# Patient Record
Sex: MALE | Race: OTHER | HISPANIC OR LATINO | Employment: FULL TIME | URBAN - METROPOLITAN AREA
[De-identification: names, ages, dates, MRNs, and addresses within clinical notes are randomized per-mention and may not be internally consistent; named-entity substitution may affect disease eponyms.]

---

## 2023-03-07 ENCOUNTER — HOSPITAL ENCOUNTER (EMERGENCY)
Facility: MEDICAL CENTER | Age: 23
End: 2023-03-07
Attending: EMERGENCY MEDICINE
Payer: COMMERCIAL

## 2023-03-07 VITALS
BODY MASS INDEX: 26.57 KG/M2 | RESPIRATION RATE: 18 BRPM | SYSTOLIC BLOOD PRESSURE: 134 MMHG | WEIGHT: 155.65 LBS | HEART RATE: 65 BPM | TEMPERATURE: 97.4 F | DIASTOLIC BLOOD PRESSURE: 90 MMHG | HEIGHT: 64 IN | OXYGEN SATURATION: 98 %

## 2023-03-07 DIAGNOSIS — S51.811A LACERATION OF RIGHT FOREARM, INITIAL ENCOUNTER: ICD-10-CM

## 2023-03-07 PROCEDURE — 99282 EMERGENCY DEPT VISIT SF MDM: CPT

## 2023-03-07 PROCEDURE — 304999 HCHG REPAIR-SIMPLE/INTERMED LEVEL 1

## 2023-03-07 PROCEDURE — 303353 HCHG DERMABOND SKIN ADHESIVE

## 2023-03-07 NOTE — LETTER
"  FORM C-4:  EMPLOYEE’S CLAIM FOR COMPENSATION/ REPORT OF INITIAL TREATMENT  EMPLOYEE’S CLAIM - PROVIDE ALL INFORMATION REQUESTED   First Name Aleksander Last Name Bri Acevedo Birthdate 2000  Sex male Claim Number   Home Address [address]             [place] [address]                                   Age  22 y.o. Height  1.626 m (5' 4\") Weight  70.6 kg (155 lb 10.3 oz) [facility]     Mailing Address [address]              Zip [address] Telephone  There are no phone numbers on file. 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 Be specific and answer in detail. Use additional sheet if necessary)  cleaning machine & the  slipped & blade cut R forearm   If you believe that you have an occupational disease, when did you first have knowledge of the disability and it relationship to your employment? NA Witnesses to the Accident  NA   Nature of Injury or Occupational Disease  Workers' Compensation Part(s) of Body Injured or Affected  Lower Arm (R), N/A, N/A    I CERTIFY THAT THE ABOVE IS TRUE AND CORRECT TO THE BEST OF MY KNOWLEDGE AND THAT I HAVE PROVIDED THIS INFORMATION IN ORDER TO OBTAIN THE BENEFITS OF [place] " OR [place]).  I HEREBY AUTHORIZE ANY PHYSICIAN, CHIROPRACTOR, SURGEON, PRACTITIONER, OR OTHER PERSON, ANY HOSPITAL, INCLUDING [facility], ANY MEDICAL SERVICE ORGANIZATION, ANY INSURANCE COMPANY, OR OTHER INSTITUTION OR ORGANIZATION TO RELEASE TO EACH OTHER, ANY MEDICAL OR OTHER INFORMATION, INCLUDING BENEFITS PAID OR PAYABLE, PERTINENT TO THIS INJURY OR DISEASE, EXCEPT INFORMATION RELATIVE TO DIAGNOSIS, TREATMENT AND/OR COUNSELING FOR AIDS, PSYCHOLOGICAL CONDITIONS, ALCOHOL OR CONTROLLED SUBSTANCES, FOR WHICH I MUST GIVE SPECIFIC AUTHORIZATION.  A PHOTOSTAT OF THIS AUTHORIZATION SHALL BE AS VALID AS THE ORIGINAL.  Date      03/07/2023                                Place    [facility]                 Employee’s Signature   THIS REPORT MUST BE COMPLETED AND MAILED WITHIN 3 WORKING DAYS OF TREATMENT   Place [facility], EMERGENCY DEPT                       Name of Facility [facility]   Date  3/7/2023 Diagnosis  (S51.811A) Laceration of right forearm, initial encounter Is there evidence the injured employee was under the influence of alcohol and/or another controlled substance at the time of accident?   Hour  5:43 PM Description of Injury or Disease  Laceration of right forearm, initial encounter No   Treatment  Wound irrigation, closure  Have you advised the patient to remain off work five days or more?         No   X-Ray Findings    If Yes   From Date    To Date      From information given by the employee, together with medical evidence, can you directly connect this injury or occupational disease as job incurred? Yes If No, is employee capable of: Full Duty  Yes Modified Duty      Is additional medical care by a physician indicated? No If Modified Duty, Specify any Limitations / Restrictions       Do you know of any previous injury or disease contributing to this condition or occupational disease? No    Date 3/7/2023 Print Doctor’s  "Name Trish Carrasco I certify the employer’s copy of this form was mailed on:   Address [address] [address] [phone] INSURER’S USE ONLY   Provider’s Tax ID Number [id number] Telephone Dept: [phone]    Doctor’s Signature chilo-TRISH Jara M.D. Degree  MD      Form C-4 (rev.10/07)                                                                         BRIEF DESCRIPTION OF RIGHTS AND BENEFITS  (Pursuant to [place] 616[place].050)    Notice of Injury or Occupational Disease (Incident Report Form C-1): If an injury or occupational disease (OD) arises out of and in the course of employment, you must provide written notice to your employer as soon as practicable, but no later than 7 days after the accident or OD. Your employer shall maintain a sufficient supply of the required forms.    Claim for Compensation (Form C-4): If medical treatment is sought, the form C-4 is available at the place of initial treatment. A completed \"Claim for Compensation\" (Form C-4) must be filed within 90 days after an accident or OD. The treating physician or chiropractor must, within 3 working days after treatment, complete and mail to the employer, the employer's insurer and third-party , the Claim for Compensation.    Medical Treatment: If you require medical treatment for your on-the-job injury or OD, you may be required to select a physician or chiropractor from a list provided by your workers’ compensation insurer, if it has contracted with an Organization for Managed Care (MCO) or Preferred Provider Organization (PPO) or providers of health care. If your employer has not entered into a contract with an MCO or PPO, you may select a physician or chiropractor from the Panel of Physicians and Chiropractors. Any medical costs related to your industrial injury or OD will be paid by your insurer.    Temporary Total Disability (TTD): If your doctor has certified that you are unable to work for a period of at least 5 " consecutive days, or 5 cumulative days in a 20-day period, or places restrictions on you that your employer does not accommodate, you may be entitled to TTD compensation.    Temporary Partial Disability (TPD): If the wage you receive upon reemployment is less than the compensation for TTD to which you are entitled, the insurer may be required to pay you TPD compensation to make up the difference. TPD can only be paid for a maximum of 24 months.    Permanent Partial Disability (PPD): When your medical condition is stable and there is an indication of a PPD as a result of your injury or OD, within 30 days, your insurer must arrange for an evaluation by a rating physician or chiropractor to determine the degree of your PPD. The amount of your PPD award depends on the date of injury, the results of the PPD evaluation, your age and wage.    Permanent Total Disability (PTD): If you are medically certified by a treating physician or chiropractor as permanently and totally disabled and have been granted a PTD status by your insurer, you are entitled to receive monthly benefits not to exceed 66 2/3% of your average monthly wage. The amount of your PTD payments is subject to reduction if you previously received a lump-sum PPD award.    Vocational Rehabilitation Services: You may be eligible for vocational rehabilitation services if you are unable to return to the job due to a permanent physical impairment or permanent restrictions as a result of your injury or occupational disease.    Transportation and Per Emilee Reimbursement: You may be eligible for travel expenses and per emilee associated with medical treatment.    Reopening: You may be able to reopen your claim if your condition worsens after claim closure.     Appeal Process: If you disagree with a written determination issued by the insurer or the insurer does not respond to your request, you may appeal to the Department of Administration, , by following  the instructions contained in your determination letter. You must appeal the determination within 70 days from the date of the determination letter at [address], [address], [place], [place] [address], or [address], [address], [place], [place] [address]. If you disagree with the  decision, you may appeal to the Department of Administration, . You must file your appeal within 30 days from the date of the  decision letter at [address], [address], [place], [place] [address], or [address], [address], [place], [place] [address]. If you disagree with a decision of an , you may file a petition for judicial review with the District Court. You must do so within 30 days of the Appeal Officer’s decision. You may be represented by an  at your own expense or you may contact the [facility] for possible representation.    [place]  for Injured Workers (NAIW): If you disagree with a  decision, you may request that NAIW represent you without charge at an  Hearing. For information regarding denial of benefits, you may contact the [facility] at: [address], [address], [place] [address], [phone], or [address], [address], [place] [address], [phone]    To File a Complaint with the Division: If you wish to file a complaint with the  of the Division of Industrial Relations (DIR),  please contact the Workers’ Compensation Section, [address], [address], [place], [place] [address], telephone [phone]) [phone], or [address], [address], [place], [place] [address], telephone [phone].    For assistance with Workers’ Compensation Issues: You may contact the [facility] Office for Consumer Health Assistance, [address], [address], [place], [place] [address], Toll Free 1-855.781.1751, Web site: http://[facility].[place].gov/Programs/ALEJANDRA  E-mail: [email or web address].[place].gov  D-2 (rev. 10/20)              __________________________________________________________________                                    03/07/2023            Employee Name / Signature                                                                                                                            Date

## 2023-03-07 NOTE — ED TRIAGE NOTES
Chief Complaint   Patient presents with    Arm Injury     PT received small laceration on right forearm after  slipped at work. Laceration, 1.5 cm in length, adipose tissue visible. Bleeding controlled. Unsure of tetanus vaccination status. CMSx3.      PT ambulatory to triage for above complaint. GCS 15.    Small bandage placed, bleeding well controlled.      Pt is alert and oriented, speaking in full sentences, follows commands and responds appropriately to questions. NAD. Resp are even and unlabored.      Pt placed in lobby. Pt educated on triage process. Pt encouraged to alert staff for any changes.     Patient and staff wearing appropriate PPE

## 2023-03-08 NOTE — DISCHARGE INSTRUCTIONS
You were treated today for a laceration. Your physical exam is reassuring. Your laceration was closed with a skin glue. Please do not apply any oil-based ointment, as this will make the glue breakdown early. Please do not soak your hand in water, as this will also cause the glue to breakdown and early. Over time, the glue will begin to break down and will come off on its own. The glue is water resistant, so you can wash it with soap and water. Showering is OK.     Please come back to the ED if you develop fever, chills, increased redness around the wound, drainage from wound or for any other concerns.

## 2023-03-08 NOTE — ED PROVIDER NOTES
ER Provider Note    Scribed for William Carrasco M.d. by Jeremias Allen. 3/7/2023  4:40 PM    Primary Care Provider: No primary care provider noted.    CHIEF COMPLAINT  Chief Complaint   Patient presents with    Arm Injury     PT received small laceration on right forearm after  slipped at work. Laceration, 1.5 cm in length, adipose tissue visible. Bleeding controlled. Unsure of tetanus vaccination status. CMSx3.      LIMITATION TO HISTORY   Select: Language Monegasque,  Used  A coworker interpreted for the patient as per his preference    HPI/ROS  OUTSIDE HISTORIAN(S):  Coworker translated    EXTERNAL RECORDS REVIEWED  No prior records available for review    Aleksander Acevedo is a 22 y.o. male who presents to the ED for evaluation of a right arm laceration onset prior to arrival. The patient states that while using a  to clean a machine at work it accidentally slipped, causing his to cut his right forearm. He was ultimately transported to the ED for further evaluation of this laceration. Bleeding is controlled at this time. Associated symptoms include right forearm pain. The patient denies any right forearm swelling or loss of sensation to the right upper extremity digits. No alleviating or exacerbating factors noted. He reports that his last tetanus vaccine was in 2021. The patient denies any pertinent medical history and does not take any daily medications.     PAST MEDICAL HISTORY  History reviewed. No pertinent past medical history.    SURGICAL HISTORY  History reviewed. No pertinent surgical history.    FAMILY HISTORY  History reviewed. No pertinent family history.    SOCIAL HISTORY   reports that he has never smoked. He has never used smokeless tobacco. He reports that he does not drink alcohol and does not use drugs.    CURRENT MEDICATIONS  There are no discharge medications for this patient.      ALLERGIES  Patient has no known allergies.    PHYSICAL EXAM  BP (!)  "138/93   Pulse 69   Temp 36.1 °C (97 °F) (Temporal)   Resp 18   Ht 1.626 m (5' 4\")   Wt 70.6 kg (155 lb 10.3 oz)   SpO2 97%   BMI 26.72 kg/m²     Gen: alert, no acute distress  HENT: ATNC  Eyes: normal conjunctiva  Resp: No respiratory distress.   CV: No JVD   Abd: Non-distended  Extremities: 2 cm laceration to the right forearm through the skin into the subcutaneous tissue, but does not violate the muscle fascia. 2+ Radial pulse. Distal CSM is intact, including full strength to finger flexion, extension, wrist flexion, extension, sensation in all distal nerve distributions.     DIAGNOSTIC STUDIES & PROCEDURES    Laceration Repair Procedure Note    Indication: Laceration    Procedure: The patient was placed in the appropriate position. The area was then irrigated with normal saline. The laceration was closed with benzoin, Steri-Strips, and ultimately Dermabond. There were no additional lacerations requiring repair. The wound area was then dressed with a sterile dressing.      Total repaired wound length: 2 cm.     Other Items: None    The patient tolerated the procedure well.    Complications: None    COURSE & MEDICAL DECISION MAKING    ED Observation Status? No; Patient does not meet criteria for ED Observation.     INITIAL ASSESSMENT AND PLAN  Care Narrative: Patient presents with a wound to the forearm from a cut with a .  He reports his tetanus is up-to-date.  He was given the option of sutures versus tissue adhesive closure.  He prefers tissue adhesive.  Steri-Strips were used to approximate the edges and Dermabond used to close the wound.  There is no evidence of involvement of the deeper structures such as neurovascular or muscles.  No evidence of tendinous involvement.      4:40 PM - Patient seen and evaluated at bedside. Aleksander Acevedo is a 22 y.o. male who presents with a right forearm laceration onset prior to arrival. Discussed performing a laceration repair, he " consents to the procedure.     4:50 PM - Laceration repair procedure performed by me as noted above. His laceration was closed with Dermabond and we discussed not applying any oil-based ointment or soaking his arm in water as it may cause the glue to breakdown prematurely. The Dermabond should fall off on its own. He was wash the area with soap and water, but do not soak. Discussed plan for discharge; I advised the patient to follow-up with his Workman's Compensation Provider as needed, and to return to the [facility] ED with any new or worsening symptoms, including fever, chills, increased redness surrounding the wound, wound drainage, or other concerns. Patient was given the opportunity for questions. I addressed all questions or concerns at this time and they verbalize agreement to the plan of care.     ADDITIONAL PROBLEM LIST AND DISPOSITION                 DISPOSITION AND DISCUSSIONS  I have discussed management of the patient with the following physicians and KATHRIN's: None    Discussion of management with other QHP or appropriate source(s): None     Barriers to care at this time, including but not limited to: Patient does not have established PCP.     The patient will return for new or worsening symptoms and is stable at the time of discharge.    The patient is referred to a primary physician for blood pressure management, diabetic screening, and for all other preventative health concerns.    DISPOSITION:  Patient will be discharged home in stable condition.    FOLLOW UP:  [facility] [place], Emergency Dept  [address] [address] [phone]    If symptoms worsen    Your Worker's Compensation provider      As needed    FINAL IMPRESSION   1. Laceration of right forearm, initial encounter    2.      Laceration repair procedure performed by me as noted above     IJeremias (Scribe), am scribing for, and in the presence of, William Carrasco M.D..    Electronically signed by: Jeremias  Alejandro (Scribe), 3/7/2023    IWilliam M.D. personally performed the services described in this documentation, as scribed by Jeremias Allen in my presence, and it is both accurate and complete.    The note accurately reflects work and decisions made by me.  William Carrasco M.D.  3/7/2023  8:23 PM